# Patient Record
Sex: MALE | Race: WHITE | NOT HISPANIC OR LATINO | Employment: FULL TIME | URBAN - NONMETROPOLITAN AREA
[De-identification: names, ages, dates, MRNs, and addresses within clinical notes are randomized per-mention and may not be internally consistent; named-entity substitution may affect disease eponyms.]

---

## 2019-02-20 ENCOUNTER — HOSPITAL ENCOUNTER (EMERGENCY)
Facility: HOSPITAL | Age: 64
Discharge: HOME | End: 2019-02-20
Attending: EMERGENCY MEDICINE | Admitting: EMERGENCY MEDICINE

## 2019-02-20 VITALS
WEIGHT: 231 LBS | HEIGHT: 71 IN | OXYGEN SATURATION: 95 % | RESPIRATION RATE: 18 BRPM | DIASTOLIC BLOOD PRESSURE: 87 MMHG | TEMPERATURE: 97.8 F | SYSTOLIC BLOOD PRESSURE: 142 MMHG | BODY MASS INDEX: 32.34 KG/M2 | HEART RATE: 77 BPM

## 2019-02-20 DIAGNOSIS — R04.0 ACUTE ANTERIOR EPISTAXIS: Primary | ICD-10-CM

## 2019-02-20 PROCEDURE — 99283 EMERGENCY DEPT VISIT LOW MDM: CPT

## 2019-02-20 RX ORDER — OXYMETAZOLINE HYDROCHLORIDE 0.05 G/100ML
1 SPRAY NASAL ONCE
Status: COMPLETED | OUTPATIENT
Start: 2019-02-20 | End: 2019-02-20

## 2019-02-20 RX ORDER — CLONIDINE HYDROCHLORIDE 0.1 MG/1
0.1 TABLET ORAL ONCE
Status: DISCONTINUED | OUTPATIENT
Start: 2019-02-20 | End: 2019-02-21 | Stop reason: HOSPADM

## 2019-02-20 RX ADMIN — OXYMETAZOLINE HYDROCHLORIDE 1 SPRAY: 5 SPRAY NASAL at 23:55

## 2019-02-21 NOTE — ED NOTES
First episode of bleeding was yesterday in right nostril lasting less than 5 min. While getting ready for work this am bleeding resumed out of right nostril lasting less than one minute; this evening around 9 pm heavy bleeding started from right nostril lasting 10-15 min.; in attempt to stop bleeding pt stuffed toilet paper into right nostril which was unsuccessful as it bleed trough and ran down face. No bleeding noted at this time while obtaining hx from patient. Pt does not take anticoagulant medications.     Jean Carlos Morgan, JAIMIE  02/20/19 222       Jean Carlos Morgan, JAIMIE  02/20/19 [id number]

## 2019-02-21 NOTE — ED PROVIDER NOTES
Subjective   Patient presents with nosebleed x2 today.  Patient on nosebleed earlier in the day that was controlled with toilet paper up his nose.  Patient notes this evening he had a recurrent bleed which bled more and he was unable to control it for about 10 minutes.  Patient is not bleeding at this time.  Patient is seeming to be stable with normal vital signs.  Patient states he is not on any blood thinners at this time, no aspirin.            Review of Systems   Constitutional: Negative.  Negative for appetite change, chills and fever.   HENT: Positive for nosebleeds. Negative for congestion.    Eyes: Negative.  Negative for photophobia and visual disturbance.   Respiratory: Negative.  Negative for cough, chest tightness and shortness of breath.    Cardiovascular: Negative.  Negative for chest pain and palpitations.   Gastrointestinal: Negative.  Negative for abdominal pain, constipation, diarrhea, nausea and vomiting.   Endocrine: Negative.    Genitourinary: Negative.  Negative for decreased urine volume, dysuria, flank pain and hematuria.   Musculoskeletal: Negative.  Negative for arthralgias, back pain, myalgias, neck pain and neck stiffness.   Skin: Negative.  Negative for pallor.   Neurological: Negative.  Negative for dizziness, syncope, weakness, light-headedness, numbness and headaches.   Psychiatric/Behavioral: Negative.  Negative for confusion and suicidal ideas. The patient is not nervous/anxious.    All other systems reviewed and are negative.      No past medical history on file.    No Known Allergies    No past surgical history on file.    No family history on file.    Social History     Socioeconomic History   • Marital status: Significant Other     Spouse name: Not on file   • Number of children: Not on file   • Years of education: Not on file   • Highest education level: Not on file           Objective   Physical Exam   Constitutional: He is oriented to person, place, and time. He appears  well-developed and well-nourished. No distress.   HENT:   Head: Normocephalic and atraumatic.   Patient with dried blood right nares on the septal region.  No active bleeding at this time.   Eyes: Conjunctivae and EOM are normal.   Neck: Normal range of motion. Neck supple. No JVD present.   Cardiovascular: Normal rate, regular rhythm, normal heart sounds and intact distal pulses. Exam reveals no gallop and no friction rub.   No murmur heard.  Pulmonary/Chest: Effort normal. No respiratory distress. He has no wheezes. He has no rales. He exhibits no tenderness.   Abdominal: Soft. Bowel sounds are normal. He exhibits no distension and no mass. There is no tenderness. There is no rebound and no guarding.   Musculoskeletal: Normal range of motion.   Neurological: He is alert and oriented to person, place, and time.   Skin: Skin is warm and dry.   Psychiatric: He has a normal mood and affect. His behavior is normal. Judgment and thought content normal.   Nursing note and vitals reviewed.      Procedures           ED Course      Labs Reviewed - No data to display    No orders to display       Patient educated on what to do in case of nosebleeds.  Patient will be discharged to outpatient follow-up, Afrin as needed as needed for nosebleeds.`        [facility]      Final diagnoses:   Acute anterior epistaxis            Jean Carlos Sauceda MD  02/20/19 [id number]

## 2019-02-21 NOTE — DISCHARGE INSTRUCTIONS
Afrin nasal spray if the nosebleed recurs.  Use the clip and hold for 15 minutes.  Do not agitate the nose.  Allow clot to form.  Follow-up with your doctor should the nosebleeds continue for further evaluation and management.  Return with any new or worsening symptoms or any concerns.

## 2021-05-17 PROBLEM — E04.9 THYROID GOITER: Status: ACTIVE | Noted: 2021-05-17

## 2021-05-17 PROBLEM — E11.9 DIABETES MELLITUS (HCC): Status: ACTIVE | Noted: 2017-05-15

## 2021-06-09 ENCOUNTER — HOSPITAL ENCOUNTER (OUTPATIENT)
Dept: MRI IMAGING | Facility: HOSPITAL | Age: 66
Discharge: HOME | End: 2021-06-09
Admitting: FAMILY MEDICINE

## 2021-06-09 DIAGNOSIS — M54.41 ACUTE RIGHT-SIDED LOW BACK PAIN WITH RIGHT-SIDED SCIATICA: ICD-10-CM

## 2021-06-09 PROCEDURE — 72148 MRI LUMBAR SPINE W/O DYE: CPT

## 2021-06-11 ENCOUNTER — HOSPITAL ENCOUNTER (OUTPATIENT)
Dept: CT IMAGING | Facility: HOSPITAL | Age: 66
Discharge: HOME | End: 2021-06-11
Admitting: FAMILY MEDICINE

## 2021-06-11 DIAGNOSIS — R29.6 FALLS FREQUENTLY: ICD-10-CM

## 2021-06-11 DIAGNOSIS — M54.41 ACUTE RIGHT-SIDED BACK PAIN WITH SCIATICA: ICD-10-CM

## 2021-06-11 PROCEDURE — 70450 CT HEAD/BRAIN W/O DYE: CPT
